# Patient Record
Sex: MALE | Race: WHITE | NOT HISPANIC OR LATINO | Employment: STUDENT | URBAN - METROPOLITAN AREA
[De-identification: names, ages, dates, MRNs, and addresses within clinical notes are randomized per-mention and may not be internally consistent; named-entity substitution may affect disease eponyms.]

---
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
## 2022-01-09 ENCOUNTER — IMMUNIZATIONS (OUTPATIENT)
Dept: FAMILY MEDICINE CLINIC | Facility: HOSPITAL | Age: 22
End: 2022-01-09

## 2022-01-09 DIAGNOSIS — Z23 ENCOUNTER FOR IMMUNIZATION: Primary | ICD-10-CM

## 2022-01-09 PROCEDURE — 0001A COVID-19 PFIZER VACC 0.3 ML: CPT

## 2022-01-09 PROCEDURE — 91300 COVID-19 PFIZER VACC 0.3 ML: CPT

## 2022-01-24 ENCOUNTER — TELEMEDICINE (OUTPATIENT)
Dept: PSYCHIATRY | Facility: CLINIC | Age: 22
End: 2022-01-24
Payer: COMMERCIAL

## 2022-01-24 DIAGNOSIS — F40.10 SOCIAL ANXIETY DISORDER: Primary | ICD-10-CM

## 2022-01-24 DIAGNOSIS — R41.840 ATTENTION AND CONCENTRATION DEFICIT: ICD-10-CM

## 2022-01-24 PROCEDURE — 99214 OFFICE O/P EST MOD 30 MIN: CPT | Performed by: NURSE PRACTITIONER

## 2022-01-24 RX ORDER — BUPROPION HYDROCHLORIDE 300 MG/1
300 TABLET ORAL DAILY
Qty: 30 TABLET | Refills: 5 | Status: SHIPPED | OUTPATIENT
Start: 2022-01-24

## 2022-01-24 NOTE — PSYCH
Virtual Regular Visit    Problem List Items Addressed This Visit        Other    Social anxiety disorder - Primary    Attention and concentration deficit           Encounter provider JULIANO Daugherty    Provider located at   [address]  [place] [facility]  [address] [address] [phone]    Patient is located in the following state in which I hold an active license [place]    Recent Visits  No visits were found meeting these conditions  Showing recent visits within past 7 days and meeting all other requirements  Today's Visits  Date Type Provider Dept   01/24/22 Telemedicine JULIANO [facility]   Showing today's visits and meeting all other requirements  Future Appointments  No visits were found meeting these conditions  Showing future appointments within next 150 days and meeting all other requirements     The patient was identified by name and date of birth  Jaquan Camacho was informed that this is a telemedicine visit and that the visit is being conducted through [facility] and patient was informed that this is a secure, HIPAA-compliant platform  Jaquan Camacho agrees to proceed  My office door was closed  No one else was in the room  He acknowledged consent and understanding of privacy and security of the video platform  The patient has agreed to participate and understands they can discontinue the visit at any time  Patient is aware this is a billable service  HPI     Current Outpatient Medications   Medication Sig Dispense Refill    buPROPion (Wellbutrin XL) 150 mg 24 hr tablet Take 1 tablet (150 mg total) by mouth daily 30 tablet 2     No current facility-administered medications for this visit  Review of Systems  Video Exam    There were no vitals filed for this visit  Physical Exam   As a result of this visit, I have referred the patient for further respiratory evaluation  No    I spent 15 minutes directly with the patient during this visit  VIRTUAL VISIT DISCLAIMER    Chelle Valdivia acknowledges that he has consented to an online visit or consultation  He understands that the online visit is based solely on information provided by him, and that, in the absence of a face-to-face physical evaluation by the physician, the diagnosis he receives is both limited and provisional in terms of accuracy and completeness  This is not intended to replace a full medical face-to-face evaluation by the physician  Chelle Valdivia understands and accepts these terms  MEDICATION MANAGEMENT NOTE        [facility]    Name and Date of Birth:  Chelle Valdivia 25 y o  2000 MRN: [id number]    Date of Visit: January 24, 2022    No Known Allergies  SUBJECTIVE:    Gab Sarmiento is seen today for a follow up for depression, anxiety and Concentration and attention deficit  He  Improved somewhat since the last visit  Reports that he noticed a positive impact since starting Wellbutrin  It is felt less anxious  Has not been feeling depressed since last visit  Will restart college classes February  Discussed risks and benefits of increasing this medication  Will increase Wellbutrin to 300 mg p o  Daily for treatment of depression anxiety and difficulty with concentration attention  He denies any side effects from medications  PLAN:  Increase Wellbutrin to 300 mg p o   Daily  Follow-up in 3-4 months  Aware of 24 hour and weekend coverage for urgent situations accessed by calling [facility] main practice number    Diagnoses and all orders for this visit:    Social anxiety disorder    Attention and concentration deficit        Current Outpatient Medications on File Prior to Visit   Medication Sig Dispense Refill    buPROPion (Wellbutrin XL) 150 mg 24 hr tablet Take 1 tablet (150 mg total) by mouth daily 30 tablet 2     No current facility-administered medications on file prior to visit  Psychotherapy Provided:     Individual psychotherapy provided: Yes  Counseling was provided during the session today for 16 minutes  Supportive counseling provided  HPI ROS Appetite Changes and Sleep:     He reports Improved sleep, normal appetite, normal energy level   Denies homicidal ideation, denies suicidal ideation    Review Of Systems:      General emotional problems, decreased functioning and sleep disturbances   Personality no change in personality   Constitutional negative   ENT negative   Cardiovascular negative   Respiratory negative   Gastrointestinal negative   Genitourinary negative   Musculoskeletal negative   Integumentary negative   Neurological negative   Endocrine negative   Other Symptoms none, all other systems are negative     Mental Status Evaluation:    Appearance Adequate hygiene and grooming   Behavior calm and cooperative   Mood euthymic  Depression Scale -  of 10 (0 = No depression)  Anxiety Scale -  of 10 (0 = No anxiety)   Speech Normal rate and volume   Affect appropriate and mood-congruent   Thought Processes Goal directed and coherent   Thought Content Does not verbalize delusional material   Associations Tightly connected   Perceptual Disturbances Denies hallucinations and does not appear to be responding to internal stimuli   Risk Potential Suicidal/Homicidal Ideation - No evidence of suicidal or homicidal ideation and patient does not verbalize suicidal or homicidal ideation  Risk of Violence - No evidence of risk for violence found on assessment  Risk of Self Mutilation - No evidence of risk for self mutilation found on assessment   Orientation oriented to person, place, time/date and situation   Memory recent and remote memory grossly intact   Consciousness alert and awake   Attention/Concentration attention span and concentration are age appropriate   Insight intact   Judgement intact   Muscle Strength and Gait normal muscle strength and normal muscle tone, normal gait/station and normal balance   Motor Activity no abnormal movements   Language no difficulty naming common objects, no difficulty repeating a phrase, no difficulty writing a sentence   Fund of Knowledge adequate knowledge of current events  adequate fund of knowledge regarding past history  adequate fund of knowledge regarding vocabulary      Past Psychiatric History Update:     Inpatient Psychiatric Admission Since Last Encounter:   no  Suicide Attempt Or Self Mutilation Since Last Encounter:   no  Incidence of Violent Behavior Since Last Encounter:   no    Traumatic History Update:     New Onset of Abuse Since Last Encounter:   no  Traumatic Events Since Last Encounter:   no    Past Medical History:    Past Medical History:   Diagnosis Date    Asthma     resolved     No past medical history pertinent negatives  No past surgical history on file    No Known Allergies  Substance Abuse History:    Social History     Substance and Sexual Activity   Alcohol Use No     Social History     Substance and Sexual Activity   Drug Use No     Social History:    Social History     Socioeconomic History    Marital status: Single     Spouse name: Not on file    Number of children: Not on file    Years of education: Not on file    Highest education level: Not on file   Occupational History    Not on file   Tobacco Use    Smoking status: Never Smoker    Smokeless tobacco: Never Used   Vaping Use    Vaping Use: Never used   Substance and Sexual Activity    Alcohol use: No    Drug use: No    Sexual activity: Not on file   Other Topics Concern    Not on file   Social History Narrative    Not on file     Social Determinants of Health     Financial Resource Strain: Not on file   Food Insecurity: Not on file   Transportation Needs: Not on file   Physical Activity: Not on file   Stress: Not on file   Social Connections: Not on file   Intimate Partner Violence: Not on file   Housing Stability: Not on file     Family Psychiatric History:     Family History   Problem Relation Age of Onset    Hyperthyroidism Mother     Diabetes Father     Hyperthyroidism Maternal Grandmother     Heart disease Maternal Grandfather         cardiac disorder    Diabetes Maternal Grandfather         type 2 DM    Diabetes Paternal Grandfather         type 2 DM     History Review: The following portions of the patient's history were reviewed and updated as appropriate: allergies, current medications, past family history, past medical history, past social history, past surgical history and problem list     OBJECTIVE:     Vital signs in last 24 hours: There were no vitals filed for this visit  Laboratory Results: I have personally reviewed all pertinent laboratory/tests results  Suicide/Homicide Risk Assessment:    Risk of Harm to Self:   The following ratings are based on assessment at the time of the interview   Recent Specific Risk Factors include: none    Risk of Harm to Others:   The following ratings are based on assessment at the time of the interview   Recent Specific Risk Factors include: none  The following interventions are recommended: no intervention changes needed    Medications Risks/Benefits:      Risks, Benefits And Possible Side Effects Of Medications:    Discussed risks and benefits of treatment with patient including Reduction in seizure threshold related to treatment with bupropion      Controlled Medication Discussion:     Not applicable    Treatment Plan:    Due for update/Updated:   JULIANO Kelley 01/24/22    This note was shared with patient

## 2022-02-18 ENCOUNTER — APPOINTMENT (OUTPATIENT)
Dept: RADIOLOGY | Facility: CLINIC | Age: 22
End: 2022-02-18
Payer: COMMERCIAL

## 2022-02-18 VITALS
HEART RATE: 61 BPM | DIASTOLIC BLOOD PRESSURE: 74 MMHG | SYSTOLIC BLOOD PRESSURE: 109 MMHG | WEIGHT: 150 LBS | HEIGHT: 68 IN | BODY MASS INDEX: 22.73 KG/M2

## 2022-02-18 DIAGNOSIS — L03.011 ACUTE PARONYCHIA OF FINGER OF RIGHT HAND: Primary | ICD-10-CM

## 2022-02-18 DIAGNOSIS — M79.644 FINGER PAIN, RIGHT: ICD-10-CM

## 2022-02-18 PROCEDURE — 73140 X-RAY EXAM OF FINGER(S): CPT

## 2022-02-18 PROCEDURE — 87186 SC STD MICRODIL/AGAR DIL: CPT | Performed by: ORTHOPAEDIC SURGERY

## 2022-02-18 PROCEDURE — 11730 AVULSION NAIL PLATE SIMPLE 1: CPT | Performed by: ORTHOPAEDIC SURGERY

## 2022-02-18 PROCEDURE — 99203 OFFICE O/P NEW LOW 30 MIN: CPT | Performed by: ORTHOPAEDIC SURGERY

## 2022-02-18 PROCEDURE — 87070 CULTURE OTHR SPECIMN AEROBIC: CPT | Performed by: ORTHOPAEDIC SURGERY

## 2022-02-18 PROCEDURE — 87205 SMEAR GRAM STAIN: CPT | Performed by: ORTHOPAEDIC SURGERY

## 2022-02-18 RX ORDER — DOXYCYCLINE 100 MG/1
100 CAPSULE ORAL 2 TIMES DAILY
Qty: 14 CAPSULE | Refills: 0 | Status: SHIPPED | OUTPATIENT
Start: 2022-02-18 | End: 2022-02-25

## 2022-02-20 LAB
BACTERIA WND AEROBE CULT: ABNORMAL
BACTERIA WND AEROBE CULT: ABNORMAL
GRAM STN SPEC: ABNORMAL
GRAM STN SPEC: ABNORMAL

## 2022-02-21 ENCOUNTER — HOSPITAL ENCOUNTER (OUTPATIENT)
Dept: MRI IMAGING | Facility: HOSPITAL | Age: 22
Discharge: HOME | End: 2022-02-21
Payer: COMMERCIAL

## 2022-02-21 DIAGNOSIS — L03.011 ACUTE PARONYCHIA OF FINGER OF RIGHT HAND: ICD-10-CM

## 2022-02-21 PROCEDURE — A9585 GADOBUTROL INJECTION: HCPCS | Performed by: PHYSICIAN ASSISTANT

## 2022-02-21 PROCEDURE — 73220 MRI UPPR EXTREMITY W/O&W/DYE: CPT

## 2022-02-21 PROCEDURE — G1004 CDSM NDSC: HCPCS

## 2022-02-21 RX ADMIN — GADOBUTROL 6 ML: 604.72 INJECTION INTRAVENOUS at 18:41

## 2022-02-22 VITALS
BODY MASS INDEX: 22.73 KG/M2 | SYSTOLIC BLOOD PRESSURE: 105 MMHG | DIASTOLIC BLOOD PRESSURE: 69 MMHG | HEIGHT: 68 IN | WEIGHT: 150 LBS | HEART RATE: 57 BPM

## 2022-02-22 DIAGNOSIS — L03.011 ACUTE PARONYCHIA OF FINGER OF RIGHT HAND: Primary | ICD-10-CM

## 2022-02-22 PROCEDURE — 99213 OFFICE O/P EST LOW 20 MIN: CPT | Performed by: ORTHOPAEDIC SURGERY

## 2022-02-22 NOTE — PROGRESS NOTES
Assessment/Plan:  1  Acute paronychia of finger of right hand         Scribe Attestation    I,:  Ria Guzman MA am acting as a scribe while in the presence of the attending physician :       I,:  Marge Kearns, DO personally performed the services described in this documentation    as scribed in my presence  :             53-year-old male with right index finger paronychia  Patient is doing well  The cultures were reviewed which grew Staphylococcus aureus  There is much less swelling and redness on exam today  The MRI was reviewed which did not demonstrate any signs of osteomyelitis  Patient advised to continue with antibiotics and betadine soaks  He may wash with soap and water  I discussed with him and his mother if there is no drainage he does not need to keep the finger covered  He can work on [employer] for range of motion  He will follow up in 2 weeks for repeat evaluation  I do believe his infection will resolve without any operative intervention  Subjective:   Cortney Barker is a 25 y o  male who presents to the office today for follow up evaluation right index finger paronychia  This was drained at his last visit and the nail was removed on 2/18/22  Patient states he is doing well  He states his pain has been improving  He has been compliant with antibiotics  He has been compliant with betadine soaks  He denies any drainage  He denies any fever or chills  He has been keeping it covered with a small wrap  Review of Systems   Constitutional: Negative for chills and fever  HENT: Negative for drooling and sneezing  Eyes: Negative for redness  Respiratory: Negative for cough and wheezing  Gastrointestinal: Negative for nausea and vomiting  Musculoskeletal: Negative for arthralgias, joint swelling and myalgias  Neurological: Negative for weakness and numbness  Psychiatric/Behavioral: Negative for behavioral problems  The patient is not nervous/anxious            Past Medical History: Diagnosis Date    Asthma     resolved       History reviewed  No pertinent surgical history  Family History   Problem Relation Age of Onset    Hyperthyroidism Mother     Diabetes Father     Hyperthyroidism Maternal Grandmother     Heart disease Maternal Grandfather         cardiac disorder    Diabetes Maternal Grandfather         type 2 DM    Diabetes Paternal Grandfather         type 2 DM       Social History     Occupational History    Not on file   Tobacco Use    Smoking status: Never Smoker    Smokeless tobacco: Never Used   Vaping Use    Vaping Use: Never used   Substance and Sexual Activity    Alcohol use: No    Drug use: No    Sexual activity: Not on file         Current Outpatient Medications:     buPROPion (Wellbutrin XL) 300 mg 24 hr tablet, Take 1 tablet (300 mg total) by mouth daily, Disp: 30 tablet, Rfl: 5    doxycycline monohydrate (MONODOX) 100 mg capsule, Take 1 capsule (100 mg total) by mouth 2 (two) times a day for 7 days, Disp: 14 capsule, Rfl: 0    mupirocin (BACTROBAN) 2 % ointment, Apply topically 2 (two) times a day to cuticle , Disp: 30 g, Rfl: 0  No current facility-administered medications for this visit  No Known Allergies    Objective:  Vitals:    02/22/22 1500   BP: 105/69   Pulse: 57       Ortho Exam     Right index finger    Can make full fist  Much less redness and swelling  FDS FDP ext intact   Compartments soft  Brisk capillary refill  S/m intact median, radial, and ulnar nerve   Nontender about the right index finger tip   No drainage with palpation   Nail bed healing well    Physical Exam  Constitutional:       Appearance: He is well-developed  HENT:      Head: Normocephalic and atraumatic  Eyes:      General:         Right eye: No discharge  Left eye: No discharge  Conjunctiva/sclera: Conjunctivae normal    Cardiovascular:      Rate and Rhythm: Normal rate  Pulmonary:      Effort: Pulmonary effort is normal  No respiratory distress  Musculoskeletal:      Cervical back: Normal range of motion and neck supple  Comments: As noted in HPI   Skin:     General: Skin is warm and dry  Neurological:      Mental Status: He is alert and oriented to person, place, and time  Psychiatric:         Behavior: Behavior normal          Thought Content: Thought content normal          Judgment: Judgment normal          I have personally reviewed pertinent films in PACS and my interpretation is as follows:MRI right index finger performed on 2/21/22 demonstrates normal MRI  No evidence of osteomyelitis    No abscess or septic joint noted

## 2022-04-22 ENCOUNTER — TELEPHONE (OUTPATIENT)
Dept: PSYCHIATRY | Facility: CLINIC | Age: 22
End: 2022-04-22

## 2022-04-22 NOTE — TELEPHONE ENCOUNTER
Writer called to notify patient that their appointment on 4/26 with Hawa Fink has been cancelled as Juwanel Going will not be in the office  Requested a call back to reschedule

## 2024-01-01 NOTE — PROGRESS NOTES
Assessment/Plan:  1  Acute paronychia of finger of right hand  XR finger right second digit-index    CANCELED: XR finger right second digit-index       25year old male with right index finger paronychia x 1 5-2 weeks  I discussed patient's xrays with him and his mother today and explained that there is one area seen in the middle phalanx that could be consistent with a nidus of infection versus an incidental finding  To further examine this, we will order a stat MRI to evaluate for signs of osteomyelitis  In addition, nail removal was performed under local anesthesia and cultures taken  The finger was then cleaned with saline washes and a dressing was applied  We did discuss that this may bleed  Betadine soaks 2x/day  At this time, we will change his antibiotic to doxycycline 100mg BID x 7 days  We discussed pain control with OTC medications for when the block wears off  We will have him follow up on Tuesday for repeat evaluation  Subjective:   Kimo Hooks is a 25 y o  male who presents to the office today for evaluation and treatment of right index finger pain  Patient states this started 1 5-2 weeks ago, but got significantly worse over the past few days  He has had redness to the finger and there was concern for infection  Patient was started on Augment approximately 5 days ago and has been taking this  He continues to have pain and erythema of the finger tip despite use of antibiotics  Review of Systems   All other systems reviewed and are negative  Past Medical History:   Diagnosis Date    Asthma     resolved       History reviewed  No pertinent surgical history      Family History   Problem Relation Age of Onset    Hyperthyroidism Mother     Diabetes Father     Hyperthyroidism Maternal Grandmother     Heart disease Maternal Grandfather         cardiac disorder    Diabetes Maternal Grandfather         type 2 DM    Diabetes Paternal Grandfather         type 2 DM       Social History     Occupational History    Not on file   Tobacco Use    Smoking status: Never Smoker    Smokeless tobacco: Never Used   Vaping Use    Vaping Use: Never used   Substance and Sexual Activity    Alcohol use: No    Drug use: No    Sexual activity: Not on file         Current Outpatient Medications:     amoxicillin-clavulanate (AUGMENTIN) 875-125 mg per tablet, Take 1 tablet by mouth 2 (two) times a day with meals for 10 days, Disp: 20 tablet, Rfl: 0    buPROPion (Wellbutrin XL) 300 mg 24 hr tablet, Take 1 tablet (300 mg total) by mouth daily, Disp: 30 tablet, Rfl: 5    mupirocin (BACTROBAN) 2 % ointment, Apply topically 2 (two) times a day to cuticle , Disp: 30 g, Rfl: 0    No Known Allergies    Objective:  Vitals:    02/18/22 1027   BP: 109/74   Pulse: 61       Ortho Exam   Right Index Finger:  No bony tenderness  Obvious purulence under the nail fold  Erythema along the distal finger c/w paronychia  Pulp soft  Finger well-perfused  Physical Exam  Vitals reviewed  Constitutional:       General: He is not in acute distress  Appearance: Normal appearance  HENT:      Head: Normocephalic and atraumatic  Eyes:      Extraocular Movements: Extraocular movements intact  Conjunctiva/sclera: Conjunctivae normal    Cardiovascular:      Pulses: Normal pulses  Pulmonary:      Effort: Pulmonary effort is normal  No respiratory distress  Abdominal:      Tenderness: There is no guarding  Musculoskeletal:      Cervical back: No rigidity  Skin:     General: Skin is warm and dry  Neurological:      Mental Status: He is alert and oriented to person, place, and time  Psychiatric:         Mood and Affect: Mood normal          Behavior: Behavior normal          Thought Content:  Thought content normal          Studies Reviewed:  I have personally reviewed pertinent films in PACS and my interpretation is as follows:  Xrays taken today of the right index finger demonstrate a small area that could be consistent with a nidus of the index finger middle phalanx that is not seen on other phalanges  There are no obvious bony changes to the distal phalanx  Procedures Performed:  Nail removal    Date/Time: 2/18/2022 11:22 AM  Performed by: Padmaja Kimball DO  Authorized by: Padmaja Kimball DO     Patient location:  Clinic[facility] Protocol:  Consent: Verbal consent obtained  Risks and benefits: risks, benefits and alternatives were discussed  Consent given by: patient  Patient understanding: patient states understanding of the procedure being performed  Patient identity confirmed: verbally with patient      Location:     Hand:  R index finger  Pre-procedure details:     Skin preparation:  Alcohol    Preparation: Patient was prepped and draped in the usual sterile fashion    Anesthesia (see MAR for exact dosages): Anesthesia method:  Local infiltration    Local anesthetic:  Lidocaine 1% w/o epi  Nail Removal:     Nail removed:  Complete    Nail bed sutured: no    Post-procedure details:     Dressing:  Petrolatum-impregnated gauze, 4x4 sterile gauze and gauze roll    Patient tolerance of procedure: Tolerated well, no immediate complications  Comments:      Cultures taken, area washed with sterile saline solution prior to dressing being applied  (2) cough or sneeze